# Patient Record
Sex: MALE | Race: BLACK OR AFRICAN AMERICAN | Employment: FULL TIME | URBAN - METROPOLITAN AREA
[De-identification: names, ages, dates, MRNs, and addresses within clinical notes are randomized per-mention and may not be internally consistent; named-entity substitution may affect disease eponyms.]

---

## 2019-01-21 ENCOUNTER — TELEPHONE (OUTPATIENT)
Dept: FAMILY MEDICINE CLINIC | Age: 29
End: 2019-01-21

## 2019-01-21 ENCOUNTER — OFFICE VISIT (OUTPATIENT)
Dept: FAMILY MEDICINE CLINIC | Age: 29
End: 2019-01-21

## 2019-01-21 VITALS
HEART RATE: 70 BPM | RESPIRATION RATE: 14 BRPM | TEMPERATURE: 96.8 F | SYSTOLIC BLOOD PRESSURE: 131 MMHG | WEIGHT: 220 LBS | HEIGHT: 72 IN | BODY MASS INDEX: 29.8 KG/M2 | DIASTOLIC BLOOD PRESSURE: 83 MMHG | OXYGEN SATURATION: 96 %

## 2019-01-21 DIAGNOSIS — R03.0 BORDERLINE HYPERTENSION: Primary | ICD-10-CM

## 2019-01-21 NOTE — PROGRESS NOTES
HISTORY OF PRESENT ILLNESS  Leonel Soto is a 25 y.o. male. HPI In for blood pressure check. Just had a daughter 2 weeks ago. Just started working out again at [employer]. No complaints of chest pain, shortness of breath, TIAs, claudication or edema. ROS    Physical Exam   Constitutional: He appears well-developed and well-nourished. HENT:   Right Ear: External ear normal.   Left Ear: External ear normal.   Mouth/Throat: Oropharynx is clear and moist.   Neck: No thyromegaly present. Cardiovascular: Normal rate, regular rhythm, normal heart sounds and intact distal pulses. Pulmonary/Chest: Effort normal and breath sounds normal. No respiratory distress. He has no wheezes. Abdominal: Soft. Bowel sounds are normal. He exhibits no distension and no mass. There is no tenderness. There is no guarding. Musculoskeletal: Normal range of motion. He exhibits no edema. Lymphadenopathy:     He has no cervical adenopathy. Nursing note and vitals reviewed. ASSESSMENT and PLAN  Orders Placed This Encounter    METABOLIC PANEL, BASIC    LIPID PANEL    CBC WITH AUTOMATED DIFF     Diagnoses and all orders for this visit:    1.  Borderline hypertension  -     METABOLIC PANEL, BASIC  -     LIPID PANEL  -     CBC WITH AUTOMATED DIFF      Follow-up Disposition: Not on File

## 2019-01-21 NOTE — PROGRESS NOTES
Chief Complaint   Patient presents with    Complete Physical    Blood Pressure Check    Allergies     1. Have you been to the ER, urgent care clinic since your last visit? Hospitalized since your last visit? ED 3 weeks ago  Weakness  [facility]    2. Have you seen or consulted any other health care providers outside of the [address] [place] [address] since your last visit? Include any pap smears or colon screening.  No     Health Maintenance Due   Topic Date Due    DTaP/Tdap/Td series (1 - Tdap) 07/07/2009

## 2019-01-22 LAB
BASOPHILS # BLD AUTO: 0 X10E3/UL (ref 0–0.2)
BASOPHILS NFR BLD AUTO: 0 %
BUN SERPL-MCNC: 19 MG/DL (ref 6–20)
BUN/CREAT SERPL: 15 (ref 9–20)
CALCIUM SERPL-MCNC: 9.4 MG/DL (ref 8.7–10.2)
CHLORIDE SERPL-SCNC: 99 MMOL/L (ref 96–106)
CHOLEST SERPL-MCNC: 172 MG/DL (ref 100–199)
CO2 SERPL-SCNC: 26 MMOL/L (ref 20–29)
CREAT SERPL-MCNC: 1.23 MG/DL (ref 0.76–1.27)
EOSINOPHIL # BLD AUTO: 0.1 X10E3/UL (ref 0–0.4)
EOSINOPHIL NFR BLD AUTO: 2 %
ERYTHROCYTE [DISTWIDTH] IN BLOOD BY AUTOMATED COUNT: 13.9 % (ref 12.3–15.4)
GLUCOSE SERPL-MCNC: 77 MG/DL (ref 65–99)
HCT VFR BLD AUTO: 40.1 % (ref 37.5–51)
HDLC SERPL-MCNC: 35 MG/DL
HGB BLD-MCNC: 13.5 G/DL (ref 13–17.7)
IMM GRANULOCYTES # BLD AUTO: 0 X10E3/UL (ref 0–0.1)
IMM GRANULOCYTES NFR BLD AUTO: 0 %
INTERPRETATION, 910389: NORMAL
LDLC SERPL CALC-MCNC: 118 MG/DL (ref 0–99)
LYMPHOCYTES # BLD AUTO: 2.8 X10E3/UL (ref 0.7–3.1)
LYMPHOCYTES NFR BLD AUTO: 61 %
MCH RBC QN AUTO: 30.7 PG (ref 26.6–33)
MCHC RBC AUTO-ENTMCNC: 33.7 G/DL (ref 31.5–35.7)
MCV RBC AUTO: 91 FL (ref 79–97)
MONOCYTES # BLD AUTO: 0.3 X10E3/UL (ref 0.1–0.9)
MONOCYTES NFR BLD AUTO: 6 %
NEUTROPHILS # BLD AUTO: 1.4 X10E3/UL (ref 1.4–7)
NEUTROPHILS NFR BLD AUTO: 31 %
PLATELET # BLD AUTO: 205 X10E3/UL (ref 150–379)
POTASSIUM SERPL-SCNC: 4.1 MMOL/L (ref 3.5–5.2)
RBC # BLD AUTO: 4.4 X10E6/UL (ref 4.14–5.8)
SODIUM SERPL-SCNC: 139 MMOL/L (ref 134–144)
TRIGL SERPL-MCNC: 95 MG/DL (ref 0–149)
VLDLC SERPL CALC-MCNC: 19 MG/DL (ref 5–40)
WBC # BLD AUTO: 4.7 X10E3/UL (ref 3.4–10.8)

## 2019-07-22 ENCOUNTER — OFFICE VISIT (OUTPATIENT)
Dept: FAMILY MEDICINE CLINIC | Age: 29
End: 2019-07-22

## 2019-07-22 VITALS
TEMPERATURE: 98.4 F | SYSTOLIC BLOOD PRESSURE: 133 MMHG | WEIGHT: 218 LBS | BODY MASS INDEX: 28.89 KG/M2 | RESPIRATION RATE: 18 BRPM | HEIGHT: 73 IN | DIASTOLIC BLOOD PRESSURE: 87 MMHG | OXYGEN SATURATION: 100 % | HEART RATE: 72 BPM

## 2019-07-22 DIAGNOSIS — R03.0 BORDERLINE HIGH BLOOD PRESSURE: Primary | ICD-10-CM

## 2019-07-22 RX ORDER — GLUCOSAMINE SULFATE 1500 MG
POWDER IN PACKET (EA) ORAL DAILY
COMMUNITY

## 2019-07-22 NOTE — PROGRESS NOTES
Chief Complaint   Patient presents with    Blood Pressure Check     Pt here for 6 month follow up. Borderline HTN    Pt went to Ed on 7/7/19. For heart palpations. Pt had EKG done. Pt had  Cardiac arrythmia. Given low dose of asa. Pt  wear heart monitor  For 30 days to monitor. Will go back cardiologist in 3-4 weeks.

## 2019-07-22 NOTE — PROGRESS NOTES
HISTORY OF PRESENT ILLNESS  Viola Wilkins is a 30 y.o. male. HPI Pt. Comes in for blood pressure check. Was in [facility] one month ago, for what sounded like a fib, wasn't started on any new meds. Now is on baby aspirin. Has also been wearing a heart monitor. Has been back working out for the last 2 1./2 weeks. Has been smoking 2 black and milds a day. ROS    Physical Exam   Constitutional: He appears well-developed and well-nourished. HENT:   Right Ear: External ear normal.   Left Ear: External ear normal.   Mouth/Throat: Oropharynx is clear and moist.   Neck: No thyromegaly present. Cardiovascular: Normal rate, regular rhythm, normal heart sounds and intact distal pulses. Pulmonary/Chest: Effort normal and breath sounds normal. No respiratory distress. He has no wheezes. Abdominal: Soft. Bowel sounds are normal. He exhibits no distension and no mass. There is no tenderness. There is no guarding. Musculoskeletal: Normal range of motion. He exhibits no edema. Lymphadenopathy:     He has no cervical adenopathy. Nursing note and vitals reviewed. ASSESSMENT and PLAN  Orders Placed This Encounter    CBC WITH AUTOMATED DIFF    LIPID PANEL    METABOLIC PANEL, COMPREHENSIVE     Orders Placed This Encounter    CBC WITH AUTOMATED DIFF    LIPID PANEL    METABOLIC PANEL, COMPREHENSIVE     Diagnoses and all orders for this visit:    1.  Borderline high blood pressure  -     CBC WITH AUTOMATED DIFF  -     LIPID PANEL  -     METABOLIC PANEL, COMPREHENSIVE

## 2022-10-24 ENCOUNTER — OFFICE VISIT (OUTPATIENT)
Dept: FAMILY MEDICINE CLINIC | Age: 32
End: 2022-10-24
Payer: COMMERCIAL

## 2022-10-24 VITALS
SYSTOLIC BLOOD PRESSURE: 147 MMHG | OXYGEN SATURATION: 99 % | TEMPERATURE: 99 F | WEIGHT: 217 LBS | DIASTOLIC BLOOD PRESSURE: 91 MMHG | HEIGHT: 73 IN | HEART RATE: 77 BPM | BODY MASS INDEX: 28.76 KG/M2 | RESPIRATION RATE: 18 BRPM

## 2022-10-24 DIAGNOSIS — E11.9 DIABETES MELLITUS TYPE 2, DIET-CONTROLLED (HCC): Primary | ICD-10-CM

## 2022-10-24 LAB
GLUCOSE POC: 125 MG/DL
HBA1C MFR BLD HPLC: 10.8 %

## 2022-10-24 PROCEDURE — 99213 OFFICE O/P EST LOW 20 MIN: CPT | Performed by: FAMILY MEDICINE

## 2022-10-24 PROCEDURE — 83036 HEMOGLOBIN GLYCOSYLATED A1C: CPT | Performed by: FAMILY MEDICINE

## 2022-10-24 PROCEDURE — 3046F HEMOGLOBIN A1C LEVEL >9.0%: CPT | Performed by: FAMILY MEDICINE

## 2022-10-24 PROCEDURE — 82947 ASSAY GLUCOSE BLOOD QUANT: CPT | Performed by: FAMILY MEDICINE

## 2022-10-24 RX ORDER — METFORMIN HYDROCHLORIDE 500 MG/1
TABLET ORAL 2 TIMES DAILY WITH MEALS
COMMUNITY
End: 2022-10-31 | Stop reason: SDUPTHER

## 2022-10-24 NOTE — PROGRESS NOTES
Chief Complaint   Patient presents with    Follow-up     Diabetes follow up        1. \"Have you been to the ER, urgent care clinic since your last visit? Hospitalized since your last visit? \" No    2. \"Have you seen or consulted any other health care providers outside of the [address] [place] [address] since your last visit? \" No     3. For patients aged 39-70: Has the patient had a colonoscopy / FIT/ Cologuard? NA - based on age      If the patient is female:    4. For patients aged 41-77: Has the patient had a mammogram within the past 2 years? NA - based on age or sex      11. For patients aged 21-65: Has the patient had a pap smear?  NA - based on age or sex    Health Maintenance Due   Topic Date Due    Hepatitis C Screening  Never done    Depression Screen  Never done    COVID-19 Vaccine (1) Never done    Pneumococcal 0-64 years (1 - PCV) Never done    DTaP/Tdap/Td series (1 - Tdap) Never done    Flu Vaccine (1) Never done

## 2022-10-24 NOTE — PROGRESS NOTES
HISTORY OF PRESENT ILLNESS  Eben Blanco is a 27 y.o. male. HPIwent to work for [employer] one year ago, likes it so far. Has been having polyuria, polydipsia for 3 weeks. [facility], blood sugar was 588. Was started on metformin 500 mg bid and has been checking his blood sugar at home. Readings have been in the 100s. Polyuria and polydipsia have improved. No complaints of chest pain, shortness of breath, TIAs, claudication or edema. Has been on a strict diet for 2 weeks. Avoiding carbs and sugar. Eating lots of fruit. Blood sugar today is 125. A1C is  10.8. Used to weigh around 200. Has got up to 230 in the last year or so. ROS    Physical Exam  Vitals and nursing note reviewed. Constitutional:       Appearance: He is well-developed. HENT:      Right Ear: External ear normal.      Left Ear: External ear normal.   Neck:      Thyroid: No thyromegaly. Cardiovascular:      Rate and Rhythm: Normal rate and regular rhythm. Heart sounds: Normal heart sounds. Pulmonary:      Effort: Pulmonary effort is normal. No respiratory distress. Breath sounds: Normal breath sounds. No wheezing. Abdominal:      General: Bowel sounds are normal. There is no distension. Palpations: Abdomen is soft. There is no mass. Tenderness: There is no abdominal tenderness. There is no guarding. Musculoskeletal:         General: Normal range of motion. Lymphadenopathy:      Cervical: No cervical adenopathy. ASSESSMENT and PLAN  Orders Placed This Encounter    AMB POC GLUCOSE, QUANTITATIVE, BLOOD    AMB POC HEMOGLOBIN A1C     Diagnoses and all orders for this visit:    1. Diabetes mellitus type 2, diet-controlled (HCC)  -     AMB POC GLUCOSE, QUANTITATIVE, BLOOD  -     AMB POC HEMOGLOBIN A1C      Follow-up and Dispositions    Return in about 2 months (around 12/24/2022). Will schedule appt with Ms. Anne. Continue metformin for now.

## 2022-10-31 RX ORDER — METFORMIN HYDROCHLORIDE 500 MG/1
500 TABLET ORAL 2 TIMES DAILY WITH MEALS
Qty: 180 TABLET | Refills: 1 | Status: SHIPPED | OUTPATIENT
Start: 2022-10-31

## 2022-10-31 NOTE — TELEPHONE ENCOUNTER
----- Message from Janay Syd sent at 10/31/2022 12:40 PM EDT -----  Subject: Refill Request    QUESTIONS  Name of Medication? Other - Metformin  Patient-reported dosage and instructions? 500mg twice daily  How many days do you have left? 8  Preferred Pharmacy? [id number]  Pharmacy phone number (if available)? [phone]  ---------------------------------------------------------------------------  --------------  Curt PUGA  What is the best way for the office to contact you? OK to leave message on   voicemail  Preferred Call Back Phone Number? [phone]  ---------------------------------------------------------------------------  --------------  SCRIPT ANSWERS  Relationship to Patient?  Self

## 2022-12-27 ENCOUNTER — OFFICE VISIT (OUTPATIENT)
Dept: FAMILY MEDICINE CLINIC | Age: 32
End: 2022-12-27
Payer: COMMERCIAL

## 2022-12-27 VITALS
WEIGHT: 222 LBS | RESPIRATION RATE: 16 BRPM | TEMPERATURE: 97.8 F | HEART RATE: 80 BPM | DIASTOLIC BLOOD PRESSURE: 104 MMHG | BODY MASS INDEX: 30.07 KG/M2 | HEIGHT: 72 IN | SYSTOLIC BLOOD PRESSURE: 149 MMHG

## 2022-12-27 DIAGNOSIS — E11.9 CONTROLLED TYPE 2 DIABETES MELLITUS WITHOUT COMPLICATION, WITHOUT LONG-TERM CURRENT USE OF INSULIN (HCC): Primary | ICD-10-CM

## 2022-12-27 LAB
GLUCOSE POC: 151 MG/DL
HBA1C MFR BLD HPLC: 8 %

## 2022-12-27 PROCEDURE — 82962 GLUCOSE BLOOD TEST: CPT | Performed by: FAMILY MEDICINE

## 2022-12-27 PROCEDURE — 3052F HG A1C>EQUAL 8.0%<EQUAL 9.0%: CPT | Performed by: FAMILY MEDICINE

## 2022-12-27 PROCEDURE — 83036 HEMOGLOBIN GLYCOSYLATED A1C: CPT | Performed by: FAMILY MEDICINE

## 2022-12-27 PROCEDURE — 99213 OFFICE O/P EST LOW 20 MIN: CPT | Performed by: FAMILY MEDICINE

## 2022-12-27 NOTE — PROGRESS NOTES
HISTORY OF PRESENT ILLNESS  Yi Amos is a 27 y.o. male. HPI In for blood pressure and cholesterol check. No complaints of chest pain, shortness of breath, TIAs, claudication or edema. Takes metformin 500 mg bid for diabetes      ROS    Physical Exam  Vitals and nursing note reviewed. Constitutional:       Appearance: He is well-developed. HENT:      Right Ear: External ear normal.      Left Ear: External ear normal.   Neck:      Thyroid: No thyromegaly. Cardiovascular:      Rate and Rhythm: Normal rate and regular rhythm. Heart sounds: Normal heart sounds. Pulmonary:      Effort: Pulmonary effort is normal. No respiratory distress. Breath sounds: Normal breath sounds. No wheezing. Abdominal:      General: Bowel sounds are normal. There is no distension. Palpations: Abdomen is soft. There is no mass. Tenderness: There is no abdominal tenderness. There is no guarding. Musculoskeletal:         General: Normal range of motion. Lymphadenopathy:      Cervical: No cervical adenopathy. ASSESSMENT and PLAN  Orders Placed This Encounter    CBC WITH AUTOMATED DIFF    METABOLIC PANEL, COMPREHENSIVE    LIPID PANEL    AMB POC HEMOGLOBIN A1C    AMB POC GLUCOSE BLOOD, BY GLUCOSE MONITORING DEVICE     Diagnoses and all orders for this visit:    1. Controlled type 2 diabetes mellitus without complication, without long-term current use of insulin (HCC)  -     AMB POC HEMOGLOBIN A1C  -     AMB POC GLUCOSE BLOOD, BY GLUCOSE MONITORING DEVICE  -     CBC WITH AUTOMATED DIFF; Future  -     METABOLIC PANEL, COMPREHENSIVE; Future  -     LIPID PANEL; Future    Recheck in 3 months.

## 2022-12-27 NOTE — PROGRESS NOTES
Chief Complaint   Patient presents with    Follow-up       1. \"Have you been to the ER, urgent care clinic since your last visit? Hospitalized since your last visit? \" No    2. \"Have you seen or consulted any other health care providers outside of the [address] [place] [address] since your last visit? \" No     3. For patients aged 39-70: Has the patient had a colonoscopy / FIT/ Cologuard? NA - based on age      If the patient is female:    4. For patients aged 41-77: Has the patient had a mammogram within the past 2 years? NA - based on age or sex      11. For patients aged 21-65: Has the patient had a pap smear?  NA - based on age or sex    Health Maintenance Due   Topic Date Due    Hepatitis C Screening  Never done    COVID-19 Vaccine (1) Never done    Pneumococcal 0-64 years (1 - PCV) Never done    DTaP/Tdap/Td series (1 - Tdap) Never done    Lipid Screen  01/21/2020    Flu Vaccine (1) Never done

## 2022-12-28 LAB
ALBUMIN SERPL-MCNC: 4.5 G/DL (ref 3.5–5)
ALBUMIN/GLOB SERPL: 1.4 {RATIO} (ref 1.1–2.2)
ALP SERPL-CCNC: 31 U/L (ref 45–117)
ALT SERPL-CCNC: 37 U/L (ref 12–78)
ANION GAP SERPL CALC-SCNC: 6 MMOL/L (ref 5–15)
AST SERPL-CCNC: 26 U/L (ref 15–37)
BASOPHILS # BLD: 0 K/UL (ref 0–0.1)
BASOPHILS NFR BLD: 1 % (ref 0–1)
BILIRUB SERPL-MCNC: 0.6 MG/DL (ref 0.2–1)
BUN SERPL-MCNC: 15 MG/DL (ref 6–20)
BUN/CREAT SERPL: 12 (ref 12–20)
CALCIUM SERPL-MCNC: 9.5 MG/DL (ref 8.5–10.1)
CHLORIDE SERPL-SCNC: 104 MMOL/L (ref 97–108)
CHOLEST SERPL-MCNC: 203 MG/DL
CO2 SERPL-SCNC: 28 MMOL/L (ref 21–32)
CREAT SERPL-MCNC: 1.29 MG/DL (ref 0.7–1.3)
DIFFERENTIAL METHOD BLD: ABNORMAL
EOSINOPHIL # BLD: 0.1 K/UL (ref 0–0.4)
EOSINOPHIL NFR BLD: 2 % (ref 0–7)
ERYTHROCYTE [DISTWIDTH] IN BLOOD BY AUTOMATED COUNT: 13.1 % (ref 11.5–14.5)
GLOBULIN SER CALC-MCNC: 3.3 G/DL (ref 2–4)
GLUCOSE SERPL-MCNC: 119 MG/DL (ref 65–100)
HCT VFR BLD AUTO: 45.5 % (ref 36.6–50.3)
HDLC SERPL-MCNC: 35 MG/DL
HDLC SERPL: 5.8 {RATIO} (ref 0–5)
HGB BLD-MCNC: 14.9 G/DL (ref 12.1–17)
IMM GRANULOCYTES # BLD AUTO: 0 K/UL (ref 0–0.04)
IMM GRANULOCYTES NFR BLD AUTO: 0 % (ref 0–0.5)
LDLC SERPL CALC-MCNC: ABNORMAL MG/DL (ref 0–100)
LDLC SERPL DIRECT ASSAY-MCNC: 133 MG/DL (ref 0–100)
LYMPHOCYTES # BLD: 3.6 K/UL (ref 0.8–3.5)
LYMPHOCYTES NFR BLD: 53 % (ref 12–49)
MCH RBC QN AUTO: 30.4 PG (ref 26–34)
MCHC RBC AUTO-ENTMCNC: 32.7 G/DL (ref 30–36.5)
MCV RBC AUTO: 92.9 FL (ref 80–99)
MONOCYTES # BLD: 0.3 K/UL (ref 0–1)
MONOCYTES NFR BLD: 4 % (ref 5–13)
NEUTS SEG # BLD: 2.7 K/UL (ref 1.8–8)
NEUTS SEG NFR BLD: 40 % (ref 32–75)
NRBC # BLD: 0 K/UL (ref 0–0.01)
NRBC BLD-RTO: 0 PER 100 WBC
PLATELET # BLD AUTO: 224 K/UL (ref 150–400)
PMV BLD AUTO: 10.6 FL (ref 8.9–12.9)
POTASSIUM SERPL-SCNC: 4.2 MMOL/L (ref 3.5–5.1)
PROT SERPL-MCNC: 7.8 G/DL (ref 6.4–8.2)
RBC # BLD AUTO: 4.9 M/UL (ref 4.1–5.7)
SODIUM SERPL-SCNC: 138 MMOL/L (ref 136–145)
TRIGL SERPL-MCNC: 447 MG/DL (ref ?–150)
VLDLC SERPL CALC-MCNC: ABNORMAL MG/DL
WBC # BLD AUTO: 6.8 K/UL (ref 4.1–11.1)

## 2023-01-03 RX ORDER — ATORVASTATIN CALCIUM 20 MG/1
20 TABLET, FILM COATED ORAL DAILY
Qty: 90 TABLET | Refills: 3 | Status: SHIPPED | OUTPATIENT
Start: 2023-01-03

## 2023-01-04 NOTE — PROGRESS NOTES
Pc with pt. Will add atorvastatin for cholesterol. Continue metformin for now. Pt. Says that sugars dropi nto the 40s  at times, and he is reluctant to increase his metformin dose.

## 2023-07-13 RX ORDER — LANCETS 33 GAUGE
EACH MISCELLANEOUS
Qty: 100 EACH | Refills: 3 | Status: SHIPPED | OUTPATIENT
Start: 2023-07-13

## 2023-07-13 RX ORDER — BLOOD SUGAR DIAGNOSTIC
STRIP MISCELLANEOUS
Qty: 100 STRIP | Refills: 3 | Status: SHIPPED | OUTPATIENT
Start: 2023-07-13

## 2023-07-13 RX ORDER — BLOOD-GLUCOSE METER
EACH MISCELLANEOUS
Qty: 1 KIT | Refills: 0 | Status: SHIPPED | OUTPATIENT
Start: 2023-07-13

## 2023-07-13 NOTE — TELEPHONE ENCOUNTER
Last appointment: 12/27/22  Next appointment: no show 5/30/23    Requested Prescriptions     Pending Prescriptions Disp Refills    blood glucose test strips (ONETOUCH VERIO) strip 100 strip 3     Sig: Check glucose once per day as directed  Dx W62.4    OneTouch Delica Lancets 49H MISC 100 each 3     Sig: Check glucose once per day as directed  Dx E11.9    Blood Glucose Monitoring Suppl (ONETOUCH VERIO) w/Device KIT 1 kit 0     Sig: Check glucose once per day as directed  Dx E11.9         For Pharmacy Admin Tracking Only    Program: Medication Refill  CPA in place:    Recommendation Provided To:    Intervention Detail: New Rx: 3, reason: Patient Preference  Intervention Accepted By:   Leonardo Dela Cruz Closed?:    Time Spent (min): 5

## 2023-07-20 ENCOUNTER — OFFICE VISIT (OUTPATIENT)
Age: 33
End: 2023-07-20
Payer: COMMERCIAL

## 2023-07-20 VITALS
SYSTOLIC BLOOD PRESSURE: 142 MMHG | HEART RATE: 86 BPM | HEIGHT: 72 IN | OXYGEN SATURATION: 98 % | RESPIRATION RATE: 16 BRPM | DIASTOLIC BLOOD PRESSURE: 95 MMHG | WEIGHT: 216.4 LBS | BODY MASS INDEX: 29.31 KG/M2 | TEMPERATURE: 97.8 F

## 2023-07-20 DIAGNOSIS — E78.00 HYPERCHOLESTEROLEMIA: ICD-10-CM

## 2023-07-20 DIAGNOSIS — I10 ESSENTIAL HYPERTENSION: Primary | ICD-10-CM

## 2023-07-20 DIAGNOSIS — F17.210 CIGARETTE SMOKER: ICD-10-CM

## 2023-07-20 DIAGNOSIS — E11.9 TYPE 2 DIABETES MELLITUS WITHOUT COMPLICATION, WITHOUT LONG-TERM CURRENT USE OF INSULIN (HCC): ICD-10-CM

## 2023-07-20 PROCEDURE — 3080F DIAST BP >= 90 MM HG: CPT | Performed by: FAMILY MEDICINE

## 2023-07-20 PROCEDURE — 3077F SYST BP >= 140 MM HG: CPT | Performed by: FAMILY MEDICINE

## 2023-07-20 PROCEDURE — 99214 OFFICE O/P EST MOD 30 MIN: CPT | Performed by: FAMILY MEDICINE

## 2023-07-20 RX ORDER — ATORVASTATIN CALCIUM 20 MG/1
20 TABLET, FILM COATED ORAL DAILY
Qty: 90 TABLET | Refills: 3 | Status: SHIPPED | OUTPATIENT
Start: 2023-07-20

## 2023-07-20 RX ORDER — NICOTINE 21 MG/24HR
1 PATCH, TRANSDERMAL 24 HOURS TRANSDERMAL DAILY
Qty: 30 PATCH | Refills: 5 | Status: SHIPPED | OUTPATIENT
Start: 2023-07-20 | End: 2024-01-16

## 2023-07-20 RX ORDER — HYDROCHLOROTHIAZIDE 12.5 MG/1
12.5 CAPSULE, GELATIN COATED ORAL EVERY MORNING
Qty: 90 CAPSULE | Refills: 3 | Status: SHIPPED | OUTPATIENT
Start: 2023-07-20

## 2023-07-20 SDOH — ECONOMIC STABILITY: FOOD INSECURITY: WITHIN THE PAST 12 MONTHS, THE FOOD YOU BOUGHT JUST DIDN'T LAST AND YOU DIDN'T HAVE MONEY TO GET MORE.: NEVER TRUE

## 2023-07-20 SDOH — ECONOMIC STABILITY: INCOME INSECURITY: HOW HARD IS IT FOR YOU TO PAY FOR THE VERY BASICS LIKE FOOD, HOUSING, MEDICAL CARE, AND HEATING?: NOT HARD AT ALL

## 2023-07-20 SDOH — ECONOMIC STABILITY: FOOD INSECURITY: WITHIN THE PAST 12 MONTHS, YOU WORRIED THAT YOUR FOOD WOULD RUN OUT BEFORE YOU GOT MONEY TO BUY MORE.: NEVER TRUE

## 2023-07-20 SDOH — ECONOMIC STABILITY: HOUSING INSECURITY
IN THE LAST 12 MONTHS, WAS THERE A TIME WHEN YOU DID NOT HAVE A STEADY PLACE TO SLEEP OR SLEPT IN A SHELTER (INCLUDING NOW)?: NO

## 2023-07-20 ASSESSMENT — PATIENT HEALTH QUESTIONNAIRE - PHQ9
SUM OF ALL RESPONSES TO PHQ QUESTIONS 1-9: 0
SUM OF ALL RESPONSES TO PHQ9 QUESTIONS 1 & 2: 0
1. LITTLE INTEREST OR PLEASURE IN DOING THINGS: 0
SUM OF ALL RESPONSES TO PHQ QUESTIONS 1-9: 0
2. FEELING DOWN, DEPRESSED OR HOPELESS: 0

## 2023-07-20 NOTE — PROGRESS NOTES
Tasha Teran (:  1990) is a 26 y.o. male,Established patient, here for evaluation of the following chief complaint(s): Annual Exam         ASSESSMENT/PLAN:  1. Essential hypertension  -     hydroCHLOROthiazide (MICROZIDE) 12.5 MG capsule; Take 1 capsule by mouth every morning For blood pressure, Disp-90 capsule, R-3Normal  -     Comprehensive Metabolic Panel; Future  -     CBC with Auto Differential; Future  2. Type 2 diabetes mellitus without complication, without long-term current use of insulin (HCC)  -     metFORMIN (GLUCOPHAGE) 500 MG tablet; Take 1 tablet by mouth 2 times daily (with meals), Disp-180 tablet, R-3Normal  -     Hemoglobin A1C; Future  -     Microalbumin / Creatinine Urine Ratio; Future  3. Hypercholesterolemia  -     atorvastatin (LIPITOR) 20 MG tablet; Take 1 tablet by mouth daily For cholesterol, Disp-90 tablet, R-3Normal  -     Lipid Panel; Future  4. Cigarette smoker  -     nicotine (NICODERM CQ) 21 MG/24HR; Place 1 patch onto the skin daily, Disp-30 patch, R-5Normal      No follow-ups on file. Subjective   SUBJECTIVE/OBJECTIVE:  HPI has been working for [employer] for the last 2 years. Needs to have diabetes and cholesterol checked. checked. Gets some occasional discomfort in chest if eats, takes meds and lies down. Does a fair amount of walking and weight lifting- no chest pains. Would also like to quit smoking. Review of Systems       Objective   Physical Exam  Cardiovascular:      Rate and Rhythm: Normal rate and regular rhythm. Heart sounds: Normal heart sounds. No murmur heard. Pulmonary:      Effort: Pulmonary effort is normal.      Breath sounds: Normal breath sounds. Abdominal:      General: Abdomen is flat. Bowel sounds are normal.      Palpations: Abdomen is soft. Musculoskeletal:      Right lower leg: No edema. Left lower leg: No edema. An electronic signature was used to authenticate this note.     --Fabiola Smyth MD

## 2023-07-20 NOTE — PROGRESS NOTES
Chief Complaint   Patient presents with    Annual Exam     Patient would assistance with stop smoking. 1. \"Have you been to the ER, urgent care clinic since your last visit? Hospitalized since your last visit? \" no    2. \"Have you seen or consulted any other health care providers outside of the [address] [place] [address] since your last visit? \" no     3. For patients aged 43-73: Has the patient had a colonoscopy / FIT/ Cologuard? na      If the patient is female:    4. For patients aged 43-66: Has the patient had a mammogram within the past 2 years? na      5.  For patients aged 21-65: Has the patient had a pap smear? na      Health Maintenance Due   Topic Date Due    Varicella vaccine (1 of 2 - 2-dose childhood series) Never done    Pneumococcal 0-64 years Vaccine (1 - PCV) Never done    Diabetic foot exam  Never done    HIV screen  Never done    Diabetic Alb to Cr ratio (uACR) test  Never done    Diabetic retinal exam  Never done    Hepatitis C screen  Never done    Hepatitis B vaccine (1 of 3 - Risk 3-dose series) Never done    DTaP/Tdap/Td vaccine (1 - Tdap) Never done    COVID-19 Vaccine (4 - Booster for Moderna series) 02/22/2022

## 2023-07-21 LAB
ALBUMIN SERPL-MCNC: 4.4 G/DL (ref 3.5–5)
ALBUMIN/GLOB SERPL: 1.5 (ref 1.1–2.2)
ALP SERPL-CCNC: 35 U/L (ref 45–117)
ALT SERPL-CCNC: 38 U/L (ref 12–78)
ANION GAP SERPL CALC-SCNC: 3 MMOL/L (ref 5–15)
AST SERPL-CCNC: 17 U/L (ref 15–37)
BASOPHILS # BLD: 0 K/UL (ref 0–0.1)
BASOPHILS NFR BLD: 0 % (ref 0–1)
BILIRUB SERPL-MCNC: 0.8 MG/DL (ref 0.2–1)
BUN SERPL-MCNC: 15 MG/DL (ref 6–20)
BUN/CREAT SERPL: 11 (ref 12–20)
CALCIUM SERPL-MCNC: 9.6 MG/DL (ref 8.5–10.1)
CHLORIDE SERPL-SCNC: 103 MMOL/L (ref 97–108)
CHOLEST SERPL-MCNC: 96 MG/DL
CO2 SERPL-SCNC: 28 MMOL/L (ref 21–32)
CREAT SERPL-MCNC: 1.32 MG/DL (ref 0.7–1.3)
CREAT UR-MCNC: 344 MG/DL
DIFFERENTIAL METHOD BLD: NORMAL
EOSINOPHIL # BLD: 0.1 K/UL (ref 0–0.4)
EOSINOPHIL NFR BLD: 1 % (ref 0–7)
ERYTHROCYTE [DISTWIDTH] IN BLOOD BY AUTOMATED COUNT: 12.6 % (ref 11.5–14.5)
EST. AVERAGE GLUCOSE BLD GHB EST-MCNC: 206 MG/DL
GLOBULIN SER CALC-MCNC: 3 G/DL (ref 2–4)
GLUCOSE SERPL-MCNC: 247 MG/DL (ref 65–100)
HBA1C MFR BLD: 8.8 % (ref 4–5.6)
HCT VFR BLD AUTO: 43.7 % (ref 36.6–50.3)
HDLC SERPL-MCNC: 34 MG/DL
HDLC SERPL: 2.8 (ref 0–5)
HGB BLD-MCNC: 13.8 G/DL (ref 12.1–17)
IMM GRANULOCYTES # BLD AUTO: 0 K/UL (ref 0–0.04)
IMM GRANULOCYTES NFR BLD AUTO: 0 % (ref 0–0.5)
LDLC SERPL CALC-MCNC: 31.4 MG/DL (ref 0–100)
LYMPHOCYTES # BLD: 2 K/UL (ref 0.8–3.5)
LYMPHOCYTES NFR BLD: 29 % (ref 12–49)
MCH RBC QN AUTO: 30.3 PG (ref 26–34)
MCHC RBC AUTO-ENTMCNC: 31.6 G/DL (ref 30–36.5)
MCV RBC AUTO: 95.8 FL (ref 80–99)
MICROALBUMIN UR-MCNC: 7.22 MG/DL
MICROALBUMIN/CREAT UR-RTO: 21 MG/G (ref 0–30)
MONOCYTES # BLD: 0.4 K/UL (ref 0–1)
MONOCYTES NFR BLD: 6 % (ref 5–13)
NEUTS SEG # BLD: 4.4 K/UL (ref 1.8–8)
NEUTS SEG NFR BLD: 64 % (ref 32–75)
NRBC # BLD: 0 K/UL (ref 0–0.01)
NRBC BLD-RTO: 0 PER 100 WBC
PLATELET # BLD AUTO: 191 K/UL (ref 150–400)
PMV BLD AUTO: 10.3 FL (ref 8.9–12.9)
POTASSIUM SERPL-SCNC: 4.5 MMOL/L (ref 3.5–5.1)
PROT SERPL-MCNC: 7.4 G/DL (ref 6.4–8.2)
RBC # BLD AUTO: 4.56 M/UL (ref 4.1–5.7)
SODIUM SERPL-SCNC: 134 MMOL/L (ref 136–145)
TRIGL SERPL-MCNC: 153 MG/DL
VLDLC SERPL CALC-MCNC: 30.6 MG/DL
WBC # BLD AUTO: 7 K/UL (ref 4.1–11.1)

## 2023-07-25 ENCOUNTER — TELEPHONE (OUTPATIENT)
Age: 33
End: 2023-07-25

## 2023-07-25 DIAGNOSIS — E11.9 TYPE 2 DIABETES MELLITUS WITHOUT COMPLICATION, WITHOUT LONG-TERM CURRENT USE OF INSULIN (HCC): ICD-10-CM

## 2023-11-30 ENCOUNTER — OFFICE VISIT (OUTPATIENT)
Age: 33
End: 2023-11-30
Payer: COMMERCIAL

## 2023-11-30 VITALS
HEART RATE: 84 BPM | DIASTOLIC BLOOD PRESSURE: 80 MMHG | HEIGHT: 73 IN | TEMPERATURE: 98 F | WEIGHT: 210 LBS | BODY MASS INDEX: 27.83 KG/M2 | SYSTOLIC BLOOD PRESSURE: 135 MMHG | RESPIRATION RATE: 16 BRPM | OXYGEN SATURATION: 99 %

## 2023-11-30 DIAGNOSIS — E11.9 TYPE 2 DIABETES MELLITUS WITHOUT COMPLICATION, WITHOUT LONG-TERM CURRENT USE OF INSULIN (HCC): ICD-10-CM

## 2023-11-30 DIAGNOSIS — E78.00 HYPERCHOLESTEROLEMIA: ICD-10-CM

## 2023-11-30 DIAGNOSIS — I10 ESSENTIAL HYPERTENSION: Primary | ICD-10-CM

## 2023-11-30 LAB
BASOPHILS # BLD: 0 K/UL (ref 0–0.1)
BASOPHILS NFR BLD: 0 % (ref 0–1)
DIFFERENTIAL METHOD BLD: ABNORMAL
EOSINOPHIL # BLD: 0.1 K/UL (ref 0–0.4)
EOSINOPHIL NFR BLD: 2 % (ref 0–7)
ERYTHROCYTE [DISTWIDTH] IN BLOOD BY AUTOMATED COUNT: 13 % (ref 11.5–14.5)
HCT VFR BLD AUTO: 40.9 % (ref 36.6–50.3)
HGB BLD-MCNC: 13.4 G/DL (ref 12.1–17)
IMM GRANULOCYTES # BLD AUTO: 0 K/UL (ref 0–0.04)
IMM GRANULOCYTES NFR BLD AUTO: 0 % (ref 0–0.5)
LYMPHOCYTES # BLD: 4 K/UL (ref 0.8–3.5)
LYMPHOCYTES NFR BLD: 58 % (ref 12–49)
MCH RBC QN AUTO: 30.5 PG (ref 26–34)
MCHC RBC AUTO-ENTMCNC: 32.8 G/DL (ref 30–36.5)
MCV RBC AUTO: 93.2 FL (ref 80–99)
MONOCYTES # BLD: 0.4 K/UL (ref 0–1)
MONOCYTES NFR BLD: 6 % (ref 5–13)
NEUTS SEG # BLD: 2.3 K/UL (ref 1.8–8)
NEUTS SEG NFR BLD: 34 % (ref 32–75)
NRBC # BLD: 0 K/UL (ref 0–0.01)
NRBC BLD-RTO: 0 PER 100 WBC
PLATELET # BLD AUTO: 196 K/UL (ref 150–400)
PMV BLD AUTO: 10 FL (ref 8.9–12.9)
RBC # BLD AUTO: 4.39 M/UL (ref 4.1–5.7)
WBC # BLD AUTO: 6.8 K/UL (ref 4.1–11.1)

## 2023-11-30 PROCEDURE — 3079F DIAST BP 80-89 MM HG: CPT | Performed by: FAMILY MEDICINE

## 2023-11-30 PROCEDURE — 99214 OFFICE O/P EST MOD 30 MIN: CPT | Performed by: FAMILY MEDICINE

## 2023-11-30 PROCEDURE — 3075F SYST BP GE 130 - 139MM HG: CPT | Performed by: FAMILY MEDICINE

## 2023-11-30 PROCEDURE — 3052F HG A1C>EQUAL 8.0%<EQUAL 9.0%: CPT | Performed by: FAMILY MEDICINE

## 2023-11-30 NOTE — PROGRESS NOTES
Mary Barrientos (:  1990) is a 26 y.o. male,Established patient, here for evaluation of the following chief complaint(s):  Diabetes, Cholesterol Problem, Hypertension, and Follow-up         ASSESSMENT/PLAN:  1. Essential hypertension  -     Comprehensive Metabolic Panel; Future  -     CBC with Auto Differential; Future  2. Type 2 diabetes mellitus without complication, without long-term current use of insulin (HCC)  -     Hemoglobin A1C; Future  3. Hypercholesterolemia  -     Lipid Panel; Future      No follow-ups on file. Subjective   SUBJECTIVE/OBJECTIVE:  HPI  Pt. Comes in for blood pressure, cholesterol, and diabetes check. No complaints of chest pain, shortness of breath, TIAs, claudication or edema. Still smoking. Still working at [employer]. No past medical history on file. Social History     Socioeconomic History    Marital status: Single     Spouse name: None    Number of children: None    Years of education: None    Highest education level: None   Tobacco Use    Smoking status: Every Day    Smokeless tobacco: Never   Substance and Sexual Activity    Alcohol use: No    Drug use: No   Social History Narrative    Working at [employer] in [facility] for the last year. , has a daughter 3years old.       Social Determinants of Health     Financial Resource Strain: Low Risk  (2023)    Overall Financial Resource Strain (CARDIA)     Difficulty of Paying Living Expenses: Not hard at all   Transportation Needs: Unknown (2023)    PRAPARE - Transportation     Lack of Transportation (Non-Medical): No   Housing Stability: Unknown (2023)    Housing Stability Vital Sign     Unstable Housing in the Last Year: No       Current Outpatient Medications   Medication Sig Dispense Refill    metFORMIN (GLUCOPHAGE) 1000 MG tablet Take 1 tablet by mouth 2 times daily (with meals) 60 tablet 12    hydroCHLOROthiazide (MICROZIDE) 12.5 MG capsule Take 1 capsule by mouth every morning For blood pressure

## 2023-12-01 LAB
ALBUMIN SERPL-MCNC: 4.4 G/DL (ref 3.5–5)
ALBUMIN/GLOB SERPL: 1.3 (ref 1.1–2.2)
ALP SERPL-CCNC: 24 U/L (ref 45–117)
ALT SERPL-CCNC: 31 U/L (ref 12–78)
ANION GAP SERPL CALC-SCNC: 7 MMOL/L (ref 5–15)
AST SERPL-CCNC: 19 U/L (ref 15–37)
BILIRUB SERPL-MCNC: 0.4 MG/DL (ref 0.2–1)
BUN SERPL-MCNC: 17 MG/DL (ref 6–20)
BUN/CREAT SERPL: 13 (ref 12–20)
CALCIUM SERPL-MCNC: 9.2 MG/DL (ref 8.5–10.1)
CHLORIDE SERPL-SCNC: 103 MMOL/L (ref 97–108)
CHOLEST SERPL-MCNC: 107 MG/DL
CO2 SERPL-SCNC: 28 MMOL/L (ref 21–32)
CREAT SERPL-MCNC: 1.3 MG/DL (ref 0.7–1.3)
EST. AVERAGE GLUCOSE BLD GHB EST-MCNC: 146 MG/DL
GLOBULIN SER CALC-MCNC: 3.4 G/DL (ref 2–4)
GLUCOSE SERPL-MCNC: 81 MG/DL (ref 65–100)
HBA1C MFR BLD: 6.7 % (ref 4–5.6)
HDLC SERPL-MCNC: 33 MG/DL
HDLC SERPL: 3.2 (ref 0–5)
LDLC SERPL CALC-MCNC: 54 MG/DL (ref 0–100)
POTASSIUM SERPL-SCNC: 4 MMOL/L (ref 3.5–5.1)
PROT SERPL-MCNC: 7.8 G/DL (ref 6.4–8.2)
SODIUM SERPL-SCNC: 138 MMOL/L (ref 136–145)
TRIGL SERPL-MCNC: 100 MG/DL
VLDLC SERPL CALC-MCNC: 20 MG/DL

## 2024-06-21 DIAGNOSIS — I10 ESSENTIAL HYPERTENSION: ICD-10-CM

## 2024-06-21 RX ORDER — HYDROCHLOROTHIAZIDE 12.5 MG/1
CAPSULE, GELATIN COATED ORAL
Qty: 90 CAPSULE | Refills: 0 | Status: SHIPPED | OUTPATIENT
Start: 2024-06-21

## 2024-06-21 NOTE — TELEPHONE ENCOUNTER
Last office visit with pcp 11/30/23.  Next office visit  with pcp none scheduled.  Due back around 5/30/24.  Addy appt reminder sent to patient for six month follow up.

## 2024-08-23 ENCOUNTER — OFFICE VISIT (OUTPATIENT)
Age: 34
End: 2024-08-23
Payer: COMMERCIAL

## 2024-08-23 DIAGNOSIS — E11.9 TYPE 2 DIABETES MELLITUS WITHOUT COMPLICATION, WITHOUT LONG-TERM CURRENT USE OF INSULIN (HCC): Primary | ICD-10-CM

## 2024-08-23 DIAGNOSIS — I10 ESSENTIAL HYPERTENSION: ICD-10-CM

## 2024-08-23 DIAGNOSIS — E78.00 HYPERCHOLESTEROLEMIA: ICD-10-CM

## 2024-08-23 LAB
ALBUMIN SERPL-MCNC: 4.5 G/DL (ref 3.5–5)
ALBUMIN/GLOB SERPL: 1.3 (ref 1.1–2.2)
ALP SERPL-CCNC: 34 U/L (ref 45–117)
ALT SERPL-CCNC: 30 U/L (ref 12–78)
ANION GAP SERPL CALC-SCNC: 3 MMOL/L (ref 5–15)
AST SERPL-CCNC: 12 U/L (ref 15–37)
BASOPHILS # BLD: 0 K/UL (ref 0–0.1)
BASOPHILS NFR BLD: 0 % (ref 0–1)
BILIRUB SERPL-MCNC: 1.1 MG/DL (ref 0.2–1)
BUN SERPL-MCNC: 12 MG/DL (ref 6–20)
BUN/CREAT SERPL: 9 (ref 12–20)
CALCIUM SERPL-MCNC: 9.8 MG/DL (ref 8.5–10.1)
CHLORIDE SERPL-SCNC: 102 MMOL/L (ref 97–108)
CHOLEST SERPL-MCNC: 99 MG/DL
CO2 SERPL-SCNC: 31 MMOL/L (ref 21–32)
CREAT SERPL-MCNC: 1.41 MG/DL (ref 0.7–1.3)
CREAT UR-MCNC: 174 MG/DL
DIFFERENTIAL METHOD BLD: ABNORMAL
EOSINOPHIL # BLD: 0.1 K/UL (ref 0–0.4)
EOSINOPHIL NFR BLD: 2 % (ref 0–7)
ERYTHROCYTE [DISTWIDTH] IN BLOOD BY AUTOMATED COUNT: 12.8 % (ref 11.5–14.5)
EST. AVERAGE GLUCOSE BLD GHB EST-MCNC: 217 MG/DL
GLOBULIN SER CALC-MCNC: 3.5 G/DL (ref 2–4)
GLUCOSE SERPL-MCNC: 178 MG/DL (ref 65–100)
GLUCOSE, POC: 222 MG/DL
HBA1C MFR BLD: 9.2 %
HBA1C MFR BLD: 9.2 % (ref 4–5.6)
HCT VFR BLD AUTO: 41.1 % (ref 36.6–50.3)
HDLC SERPL-MCNC: 34 MG/DL
HDLC SERPL: 2.9 (ref 0–5)
HGB BLD-MCNC: 13.4 G/DL (ref 12.1–17)
IMM GRANULOCYTES # BLD AUTO: 0 K/UL (ref 0–0.04)
IMM GRANULOCYTES NFR BLD AUTO: 0 % (ref 0–0.5)
LDLC SERPL CALC-MCNC: 50 MG/DL (ref 0–100)
LYMPHOCYTES # BLD: 2.4 K/UL (ref 0.8–3.5)
LYMPHOCYTES NFR BLD: 52 % (ref 12–49)
MCH RBC QN AUTO: 30.4 PG (ref 26–34)
MCHC RBC AUTO-ENTMCNC: 32.6 G/DL (ref 30–36.5)
MCV RBC AUTO: 93.2 FL (ref 80–99)
MICROALBUMIN UR-MCNC: 3.02 MG/DL
MICROALBUMIN/CREAT UR-RTO: 17 MG/G (ref 0–30)
MONOCYTES # BLD: 0.3 K/UL (ref 0–1)
MONOCYTES NFR BLD: 5 % (ref 5–13)
NEUTS SEG # BLD: 1.9 K/UL (ref 1.8–8)
NEUTS SEG NFR BLD: 41 % (ref 32–75)
NRBC # BLD: 0 K/UL (ref 0–0.01)
NRBC BLD-RTO: 0 PER 100 WBC
PLATELET # BLD AUTO: 196 K/UL (ref 150–400)
PMV BLD AUTO: 10.3 FL (ref 8.9–12.9)
POTASSIUM SERPL-SCNC: 4.7 MMOL/L (ref 3.5–5.1)
PROT SERPL-MCNC: 8 G/DL (ref 6.4–8.2)
RBC # BLD AUTO: 4.41 M/UL (ref 4.1–5.7)
SODIUM SERPL-SCNC: 136 MMOL/L (ref 136–145)
SPECIMEN HOLD: NORMAL
TRIGL SERPL-MCNC: 75 MG/DL
VLDLC SERPL CALC-MCNC: 15 MG/DL
WBC # BLD AUTO: 4.7 K/UL (ref 4.1–11.1)

## 2024-08-23 PROCEDURE — 82962 GLUCOSE BLOOD TEST: CPT | Performed by: FAMILY MEDICINE

## 2024-08-23 PROCEDURE — 3046F HEMOGLOBIN A1C LEVEL >9.0%: CPT | Performed by: FAMILY MEDICINE

## 2024-08-23 PROCEDURE — 99214 OFFICE O/P EST MOD 30 MIN: CPT | Performed by: FAMILY MEDICINE

## 2024-08-23 PROCEDURE — 83036 HEMOGLOBIN GLYCOSYLATED A1C: CPT | Performed by: FAMILY MEDICINE

## 2024-08-23 RX ORDER — HYDROCHLOROTHIAZIDE 25 MG/1
25 TABLET ORAL EVERY MORNING
Qty: 90 TABLET | Refills: 3 | Status: SHIPPED | OUTPATIENT
Start: 2024-08-23

## 2024-08-23 RX ORDER — SEMAGLUTIDE 1.34 MG/ML
INJECTION, SOLUTION SUBCUTANEOUS
Qty: 1.5 ML | Refills: 5 | Status: SHIPPED | OUTPATIENT
Start: 2024-08-23

## 2024-08-23 SDOH — ECONOMIC STABILITY: FOOD INSECURITY: WITHIN THE PAST 12 MONTHS, THE FOOD YOU BOUGHT JUST DIDN'T LAST AND YOU DIDN'T HAVE MONEY TO GET MORE.: NEVER TRUE

## 2024-08-23 SDOH — ECONOMIC STABILITY: INCOME INSECURITY: HOW HARD IS IT FOR YOU TO PAY FOR THE VERY BASICS LIKE FOOD, HOUSING, MEDICAL CARE, AND HEATING?: NOT HARD AT ALL

## 2024-08-23 SDOH — ECONOMIC STABILITY: FOOD INSECURITY: WITHIN THE PAST 12 MONTHS, YOU WORRIED THAT YOUR FOOD WOULD RUN OUT BEFORE YOU GOT MONEY TO BUY MORE.: NEVER TRUE

## 2024-08-23 ASSESSMENT — PATIENT HEALTH QUESTIONNAIRE - PHQ9
SUM OF ALL RESPONSES TO PHQ QUESTIONS 1-9: 0
SUM OF ALL RESPONSES TO PHQ9 QUESTIONS 1 & 2: 0
SUM OF ALL RESPONSES TO PHQ QUESTIONS 1-9: 0
SUM OF ALL RESPONSES TO PHQ QUESTIONS 1-9: 0
1. LITTLE INTEREST OR PLEASURE IN DOING THINGS: NOT AT ALL
2. FEELING DOWN, DEPRESSED OR HOPELESS: NOT AT ALL
SUM OF ALL RESPONSES TO PHQ QUESTIONS 1-9: 0

## 2024-08-23 NOTE — PROGRESS NOTES
Chief Complaint   Patient presents with    Diabetes    Cholesterol Problem    Hypertension    Follow-up       \"Have you been to the ER, urgent care clinic since your last visit?  Hospitalized since your last visit?\"    NO    “Have you seen or consulted any other health care providers outside of [facility] since your last visit?”    NO          Click Here for Release of Records Request       There were no vitals filed for this visit.   Health Maintenance Due   Topic Date Due    Pneumococcal 0-64 years Vaccine (1 of 2 - PCV) Never done    Diabetic foot exam  Never done    Varicella vaccine (1 of 2 - 13+ 2-dose series) Never done    HIV screen  Never done    Diabetic retinal exam  Never done    Hepatitis C screen  Never done    Hepatitis B vaccine (1 of 3 - 19+ 3-dose series) Never done    DTaP/Tdap/Td vaccine (1 - Tdap) Never done    COVID-19 Vaccine ( - - season) 2023    Diabetic Alb to Cr ratio (uACR) test  2024    Depression Screen  2024    Flu vaccine (1) Never done        The patient, Juan Nicloe, identity was verified by name and .

## 2024-08-23 NOTE — ASSESSMENT & PLAN NOTE
Somewhat disturbing that A1c has gone up despite weight loss. Will start ozempic and recheck in 3 months. May need insulin before long.     Orders:    AMB POC HEMOGLOBIN A1C    AMB POC GLUCOSE BLOOD, BY GLUCOSE MONITORING DEVICE    Microalbumin / Creatinine Urine Ratio; Future    Hemoglobin A1C; Future    Semaglutide,0.25 or 0.5MG/DOS, (OZEMPIC, 0.25 OR 0.5 MG/DOSE,) 2 MG/1.5ML SOPN; 0.25 mg sc every 7 days for 4 weeks, then 0.5 mg every 7 days.    Hemoglobin A1C    Microalbumin / Creatinine Urine Ratio

## 2024-08-23 NOTE — PROGRESS NOTES
Juan Nicole (:  1990) is a 34 y.o. male,Established patient, here for evaluation of the following chief complaint(s):  Diabetes, Cholesterol Problem, Hypertension, and Follow-up         Assessment & Plan  Type 2 diabetes mellitus without complication, without long-term current use of insulin (HCC)    Somewhat disturbing that A1c has gone up despite weight loss. Will start ozempic and recheck in 3 months. May need insulin before long.     Orders:    AMB POC HEMOGLOBIN A1C    AMB POC GLUCOSE BLOOD, BY GLUCOSE MONITORING DEVICE    Microalbumin / Creatinine Urine Ratio; Future    Hemoglobin A1C; Future    Semaglutide,0.25 or 0.5MG/DOS, (OZEMPIC, 0.25 OR 0.5 MG/DOSE,) 2 MG/1.5ML SOPN; 0.25 mg sc every 7 days for 4 weeks, then 0.5 mg every 7 days.    Hemoglobin A1C    Microalbumin / Creatinine Urine Ratio    Hypercholesterolemia       Orders:    Lipid Panel; Future    Lipid Panel    Essential hypertension    Have increased hctz a bit due to diastolic hypertension.     Orders:    CBC with Auto Differential; Future    Comprehensive Metabolic Panel; Future    hydroCHLOROthiazide (HYDRODIURIL) 25 MG tablet; Take 1 tablet by mouth every morning For blood pressure    Comprehensive Metabolic Panel    CBC with Auto Differential      No follow-ups on file.     Recheck in 3 months.   Subjective   HPI Pt. Comes in for blood pressure, cholesterol, and diabetes check.  No complaints of chest pain, shortness of breath, TIAs, claudication or edema.  Waking 2 miles a day. Working at [employer] in [place]. , one daughter in .    Review of Systems       Objective   Physical Exam  Cardiovascular:      Rate and Rhythm: Normal rate and regular rhythm.      Heart sounds: Normal heart sounds. No murmur heard.  Pulmonary:      Effort: Pulmonary effort is normal.      Breath sounds: Normal breath sounds.   Abdominal:      General: Abdomen is flat. Bowel sounds are normal.      Palpations: Abdomen is soft.

## 2024-09-19 ENCOUNTER — CLINICAL DOCUMENTATION (OUTPATIENT)
Age: 34
End: 2024-09-19

## 2024-09-21 DIAGNOSIS — E78.00 HYPERCHOLESTEROLEMIA: ICD-10-CM

## 2024-09-21 DIAGNOSIS — I10 ESSENTIAL HYPERTENSION: ICD-10-CM

## 2024-09-22 DIAGNOSIS — E11.9 TYPE 2 DIABETES MELLITUS WITHOUT COMPLICATION, WITHOUT LONG-TERM CURRENT USE OF INSULIN (HCC): ICD-10-CM

## 2024-09-23 RX ORDER — ATORVASTATIN CALCIUM 20 MG/1
TABLET, FILM COATED ORAL
Qty: 90 TABLET | Refills: 3 | Status: SHIPPED | OUTPATIENT
Start: 2024-09-23

## 2024-09-23 RX ORDER — HYDROCHLOROTHIAZIDE 12.5 MG/1
CAPSULE ORAL
Qty: 90 CAPSULE | Refills: 3 | Status: SHIPPED | OUTPATIENT
Start: 2024-09-23

## 2024-11-06 ENCOUNTER — CLINICAL DOCUMENTATION (OUTPATIENT)
Age: 34
End: 2024-11-06

## 2024-11-21 ENCOUNTER — OFFICE VISIT (OUTPATIENT)
Age: 34
End: 2024-11-21
Payer: COMMERCIAL

## 2024-11-21 VITALS
RESPIRATION RATE: 16 BRPM | OXYGEN SATURATION: 100 % | TEMPERATURE: 97 F | HEART RATE: 60 BPM | DIASTOLIC BLOOD PRESSURE: 82 MMHG | BODY MASS INDEX: 26.11 KG/M2 | SYSTOLIC BLOOD PRESSURE: 133 MMHG | WEIGHT: 197 LBS | HEIGHT: 73 IN

## 2024-11-21 DIAGNOSIS — R07.89 OTHER CHEST PAIN: ICD-10-CM

## 2024-11-21 DIAGNOSIS — I10 ESSENTIAL HYPERTENSION: ICD-10-CM

## 2024-11-21 DIAGNOSIS — E78.00 HYPERCHOLESTEROLEMIA: ICD-10-CM

## 2024-11-21 DIAGNOSIS — F17.210 CIGARETTE SMOKER: ICD-10-CM

## 2024-11-21 DIAGNOSIS — E11.9 TYPE 2 DIABETES MELLITUS WITHOUT COMPLICATION, WITHOUT LONG-TERM CURRENT USE OF INSULIN (HCC): Primary | ICD-10-CM

## 2024-11-21 LAB — HBA1C MFR BLD: 7.4 %

## 2024-11-21 PROCEDURE — 3046F HEMOGLOBIN A1C LEVEL >9.0%: CPT | Performed by: FAMILY MEDICINE

## 2024-11-21 PROCEDURE — 3075F SYST BP GE 130 - 139MM HG: CPT | Performed by: FAMILY MEDICINE

## 2024-11-21 PROCEDURE — 83036 HEMOGLOBIN GLYCOSYLATED A1C: CPT | Performed by: FAMILY MEDICINE

## 2024-11-21 PROCEDURE — 3079F DIAST BP 80-89 MM HG: CPT | Performed by: FAMILY MEDICINE

## 2024-11-21 PROCEDURE — 99214 OFFICE O/P EST MOD 30 MIN: CPT | Performed by: FAMILY MEDICINE

## 2024-11-21 NOTE — PROGRESS NOTES
Juan Nicole (:  1990) is a 34 y.o. male,Established patient, here for evaluation of the following chief complaint(s):  Diabetes, Follow-up, and referral to allergy testing         Assessment & Plan  Type 2 diabetes mellitus without complication, without long-term current use of insulin (HCC)       Orders:    AMB POC HEMOGLOBIN A1C    Microalbumin / Creatinine Urine Ratio; Future    Raul Guillaume MD, Cardiology, [place]    Essential hypertension       Orders:    CBC with Auto Differential; Future    Comprehensive Metabolic Panel; Future    [facility]    Hypercholesterolemia       Orders:    Lipid Panel; Future    [facility]    Cigarette smoker       Orders:    Raul Guillaume MD, Cardiology, [place]    Other chest pain       Orders:    [facility]  chest pain somewhat atypical, however, has multiple risk factors for cad including family history., so will refer.     No follow-ups on file.       Subjective   HPI in for follouwp. Was hading some chest pains 3 weeks ago,, pains were lasting 15-20 seconds.was working at [employer], doing lots of walking. Walking around made pains worse. Went to ER at [facility]- had cxr, EKG, blood work- was sent home. Hasn't been exercising outside of work. wasn't really having exertional chest pain at  work when was walking around. No furher chest pains since leaving ER. Otherwise doing well. Daughter is 5, plans on having another child next year. Not always taking metformin regularly. Still smoking black and milds at least 2 a day. About to start a new job at [employer]. Also needs blood pressure, cholesterol and diabetes checked.  Last A1C was 9.2.     Review of Systems       Objective   Physical Exam  Cardiovascular:      Rate and Rhythm: Normal rate and regular rhythm.      Heart sounds: Normal heart sounds. No murmur

## 2024-11-21 NOTE — PROGRESS NOTES
Chief Complaint   Patient presents with    Diabetes    Follow-up    referral to allergy testing       \"Have you been to the ER, urgent care clinic since your last visit?  Hospitalized since your last visit?\"     [facility] - [facility] - work related - chest pain     “Have you seen or consulted any other health care providers outside of [facility] since your last visit?”    NO          Click Here for Release of Records Request       There were no vitals filed for this visit.   Health Maintenance Due   Topic Date Due    Pneumococcal 0-64 years Vaccine (1 of 2 - PCV) Never done    Diabetic foot exam  Never done    Varicella vaccine (1 of 2 - 13+ 2-dose series) Never done    HIV screen  Never done    Diabetic retinal exam  Never done    Hepatitis C screen  Never done    Hepatitis B vaccine (1 of 3 - 19+ 3-dose series) Never done    DTaP/Tdap/Td vaccine (1 - Tdap) Never done    Flu vaccine (1) Never done    COVID-19 Vaccine ( - - season) 2024    A1C test (Diabetic or Prediabetic)  2024        The patient, Juan Nicole, identity was verified by name and .

## 2024-11-21 NOTE — ASSESSMENT & PLAN NOTE
Orders:    AMB POC HEMOGLOBIN A1C    Microalbumin / Creatinine Urine Ratio; Future    [facility] - Raul Cervantes MD, Cardiology, [place]

## 2024-11-23 LAB
ALBUMIN SERPL-MCNC: 4.4 G/DL (ref 3.5–5)
ALBUMIN/GLOB SERPL: 1.4 (ref 1.1–2.2)
ALP SERPL-CCNC: 27 U/L (ref 45–117)
ALT SERPL-CCNC: 22 U/L (ref 12–78)
ANION GAP SERPL CALC-SCNC: 5 MMOL/L (ref 2–12)
AST SERPL-CCNC: 13 U/L (ref 15–37)
BASOPHILS # BLD: 0 K/UL (ref 0–0.1)
BASOPHILS NFR BLD: 0 % (ref 0–1)
BILIRUB SERPL-MCNC: 0.7 MG/DL (ref 0.2–1)
BUN SERPL-MCNC: 15 MG/DL (ref 6–20)
BUN/CREAT SERPL: 12 (ref 12–20)
CALCIUM SERPL-MCNC: 9.8 MG/DL (ref 8.5–10.1)
CHLORIDE SERPL-SCNC: 104 MMOL/L (ref 97–108)
CHOLEST SERPL-MCNC: 91 MG/DL
CO2 SERPL-SCNC: 31 MMOL/L (ref 21–32)
CREAT SERPL-MCNC: 1.25 MG/DL (ref 0.7–1.3)
CREAT UR-MCNC: 295 MG/DL
DIFFERENTIAL METHOD BLD: ABNORMAL
EOSINOPHIL # BLD: 0.1 K/UL (ref 0–0.4)
EOSINOPHIL NFR BLD: 2 % (ref 0–7)
ERYTHROCYTE [DISTWIDTH] IN BLOOD BY AUTOMATED COUNT: 12.9 % (ref 11.5–14.5)
GLOBULIN SER CALC-MCNC: 3.2 G/DL (ref 2–4)
GLUCOSE SERPL-MCNC: 99 MG/DL (ref 65–100)
HCT VFR BLD AUTO: 42.5 % (ref 36.6–50.3)
HDLC SERPL-MCNC: 39 MG/DL
HDLC SERPL: 2.3 (ref 0–5)
HGB BLD-MCNC: 13.5 G/DL (ref 12.1–17)
IMM GRANULOCYTES # BLD AUTO: 0 K/UL (ref 0–0.04)
IMM GRANULOCYTES NFR BLD AUTO: 0 % (ref 0–0.5)
LDLC SERPL CALC-MCNC: 41.2 MG/DL (ref 0–100)
LYMPHOCYTES # BLD: 3.5 K/UL (ref 0.8–3.5)
LYMPHOCYTES NFR BLD: 54 % (ref 12–49)
MCH RBC QN AUTO: 29.9 PG (ref 26–34)
MCHC RBC AUTO-ENTMCNC: 31.8 G/DL (ref 30–36.5)
MCV RBC AUTO: 94 FL (ref 80–99)
MICROALBUMIN UR-MCNC: 1.78 MG/DL
MICROALBUMIN/CREAT UR-RTO: 6 MG/G (ref 0–30)
MONOCYTES # BLD: 0.4 K/UL (ref 0–1)
MONOCYTES NFR BLD: 6 % (ref 5–13)
NEUTS SEG # BLD: 2.5 K/UL (ref 1.8–8)
NEUTS SEG NFR BLD: 38 % (ref 32–75)
NRBC # BLD: 0 K/UL (ref 0–0.01)
NRBC BLD-RTO: 0 PER 100 WBC
PLATELET # BLD AUTO: 221 K/UL (ref 150–400)
PMV BLD AUTO: 9.9 FL (ref 8.9–12.9)
POTASSIUM SERPL-SCNC: 3.8 MMOL/L (ref 3.5–5.1)
PROT SERPL-MCNC: 7.6 G/DL (ref 6.4–8.2)
RBC # BLD AUTO: 4.52 M/UL (ref 4.1–5.7)
SODIUM SERPL-SCNC: 140 MMOL/L (ref 136–145)
TRIGL SERPL-MCNC: 54 MG/DL
VLDLC SERPL CALC-MCNC: 10.8 MG/DL
WBC # BLD AUTO: 6.5 K/UL (ref 4.1–11.1)

## 2024-12-05 ENCOUNTER — PATIENT MESSAGE (OUTPATIENT)
Age: 34
End: 2024-12-05

## 2024-12-05 DIAGNOSIS — E11.9 TYPE 2 DIABETES MELLITUS WITHOUT COMPLICATION, WITHOUT LONG-TERM CURRENT USE OF INSULIN (HCC): ICD-10-CM

## 2024-12-05 NOTE — PROGRESS NOTES
Pc with pt. Had an episode of hypoglycemia- today around 1:30. Will decrease metformin to 1000 mg bid to 1/2 in am and 1 in pm.

## 2025-05-22 ENCOUNTER — OFFICE VISIT (OUTPATIENT)
Age: 35
End: 2025-05-22
Payer: COMMERCIAL

## 2025-05-22 VITALS
RESPIRATION RATE: 16 BRPM | SYSTOLIC BLOOD PRESSURE: 135 MMHG | OXYGEN SATURATION: 99 % | BODY MASS INDEX: 27.01 KG/M2 | WEIGHT: 203.8 LBS | DIASTOLIC BLOOD PRESSURE: 79 MMHG | HEIGHT: 73 IN | HEART RATE: 82 BPM | TEMPERATURE: 98.4 F

## 2025-05-22 DIAGNOSIS — E11.9 TYPE 2 DIABETES MELLITUS WITHOUT COMPLICATION, WITHOUT LONG-TERM CURRENT USE OF INSULIN (HCC): Primary | ICD-10-CM

## 2025-05-22 DIAGNOSIS — E78.00 HYPERCHOLESTEROLEMIA: ICD-10-CM

## 2025-05-22 DIAGNOSIS — I10 ESSENTIAL HYPERTENSION: ICD-10-CM

## 2025-05-22 LAB — HBA1C MFR BLD: 9.5 %

## 2025-05-22 PROCEDURE — 3078F DIAST BP <80 MM HG: CPT | Performed by: FAMILY MEDICINE

## 2025-05-22 PROCEDURE — 99213 OFFICE O/P EST LOW 20 MIN: CPT | Performed by: FAMILY MEDICINE

## 2025-05-22 PROCEDURE — 83036 HEMOGLOBIN GLYCOSYLATED A1C: CPT | Performed by: FAMILY MEDICINE

## 2025-05-22 PROCEDURE — 3046F HEMOGLOBIN A1C LEVEL >9.0%: CPT | Performed by: FAMILY MEDICINE

## 2025-05-22 PROCEDURE — 3075F SYST BP GE 130 - 139MM HG: CPT | Performed by: FAMILY MEDICINE

## 2025-05-22 RX ORDER — GLIPIZIDE 10 MG/1
10 TABLET ORAL
Qty: 180 TABLET | Refills: 3 | Status: SHIPPED | OUTPATIENT
Start: 2025-05-22

## 2025-05-22 SDOH — ECONOMIC STABILITY: FOOD INSECURITY: WITHIN THE PAST 12 MONTHS, YOU WORRIED THAT YOUR FOOD WOULD RUN OUT BEFORE YOU GOT MONEY TO BUY MORE.: NEVER TRUE

## 2025-05-22 SDOH — ECONOMIC STABILITY: FOOD INSECURITY: WITHIN THE PAST 12 MONTHS, THE FOOD YOU BOUGHT JUST DIDN'T LAST AND YOU DIDN'T HAVE MONEY TO GET MORE.: NEVER TRUE

## 2025-05-22 ASSESSMENT — PATIENT HEALTH QUESTIONNAIRE - PHQ9
2. FEELING DOWN, DEPRESSED OR HOPELESS: NOT AT ALL
SUM OF ALL RESPONSES TO PHQ QUESTIONS 1-9: 0
1. LITTLE INTEREST OR PLEASURE IN DOING THINGS: NOT AT ALL

## 2025-05-22 NOTE — ASSESSMENT & PLAN NOTE
Orders:    AMB POC HEMOGLOBIN A1C    HM DIABETES FOOT EXAM    metFORMIN (GLUCOPHAGE) 1000 MG tablet; 1 tab po in am and 1 tab po in pm. For diabetes    Comprehensive Metabolic Panel; Future    CBC with Auto Differential; Future    Albumin/Creatinine Ratio, Urine; Future

## 2025-05-22 NOTE — PROGRESS NOTES
Juan Nicole (:  1990) is a 34 y.o. male,Established patient, here for evaluation of the following chief complaint(s):  Diabetes and Follow-up         Assessment & Plan  Type 2 diabetes mellitus without complication, without long-term current use of insulin ([facility])       Orders:    AMB POC HEMOGLOBIN A1C     DIABETES FOOT EXAM    metFORMIN (GLUCOPHAGE) 1000 MG tablet; 1 tab po in am and 1 tab po in pm. For diabetes    Comprehensive Metabolic Panel; Future    CBC with Auto Differential; Future    Albumin/Creatinine Ratio, Urine; Future    Essential hypertension   Chronic, at goal (stable), continue current treatment plan         Hypercholesterolemia       Orders:    Lipid Panel; Future      Return in about 3 months (around 2025).       Subjective   HPI IN for diabetes check. Went to ER yesterday as blood sugars had been in the mid 200s. Currently on metformin 1000 mg bid. Used to work at [employer] , now at [employer]. Hasn't been exercising that much. Has gained 6 lbs since loast seen. Some polydipsia. Was at [facility] yesterday. Not very enthusiastic about giving himself a shot. Also needs blood pressure and cholesterol checked. No complaints of chest pain, shortness of breath, TIAs, claudication or edema.      Review of Systems       Objective   Physical Exam  Cardiovascular:      Rate and Rhythm: Normal rate and regular rhythm.      Heart sounds: Normal heart sounds. No murmur heard.  Pulmonary:      Effort: Pulmonary effort is normal.      Breath sounds: Normal breath sounds.   Abdominal:      General: Abdomen is flat. Bowel sounds are normal.      Palpations: Abdomen is soft.   Musculoskeletal:      Right lower leg: No edema.      Left lower leg: No edema.                  An electronic signature was used to authenticate this note.    --Pierre Calix MD

## 2025-05-22 NOTE — PROGRESS NOTES
Chief Complaint   Patient presents with    Diabetes    Follow-up       \"Have you been to the ER, urgent care clinic since your last visit?  Hospitalized since your last visit?\"       [facility] ER -     “Have you seen or consulted any other health care providers outside of [facility] since your last visit?”    NO          Click Here for Release of Records Request       Vitals:    25 1443   BP: 135/79   Pulse: 82   Resp: 16   Temp: 98.4 °F (36.9 °C)   SpO2: 99%      Health Maintenance Due   Topic Date Due    Varicella vaccine (1 of 2 - 13+ 2-dose series) Never done    HIV screen  Never done    Diabetic retinal exam  Never done    Hepatitis C screen  Never done    Hepatitis B vaccine (1 of 3 - 19+ 3-dose series) Never done    DTaP/Tdap/Td vaccine (1 - Tdap) Never done    Pneumococcal 0-49 years Vaccine (1 of 2 - PCV) Never done    COVID-19 Vaccine (2024- season) 2024        The patient, Juan Nicole, identity was verified by name and .

## 2025-05-23 LAB
ALBUMIN SERPL-MCNC: 4.6 G/DL (ref 3.5–5)
ALBUMIN/GLOB SERPL: 1.4 (ref 1.1–2.2)
ALP SERPL-CCNC: 35 U/L (ref 45–117)
ALT SERPL-CCNC: 25 U/L (ref 12–78)
ANION GAP SERPL CALC-SCNC: 7 MMOL/L (ref 2–12)
AST SERPL-CCNC: 33 U/L (ref 15–37)
BASOPHILS # BLD: 0.02 K/UL (ref 0–0.1)
BASOPHILS NFR BLD: 0.3 % (ref 0–1)
BILIRUB SERPL-MCNC: 0.7 MG/DL (ref 0.2–1)
BUN SERPL-MCNC: 20 MG/DL (ref 6–20)
BUN/CREAT SERPL: 14 (ref 12–20)
CALCIUM SERPL-MCNC: 9 MG/DL (ref 8.5–10.1)
CHLORIDE SERPL-SCNC: 98 MMOL/L (ref 97–108)
CHOLEST SERPL-MCNC: 106 MG/DL
CO2 SERPL-SCNC: 29 MMOL/L (ref 21–32)
CREAT SERPL-MCNC: 1.4 MG/DL (ref 0.7–1.3)
CREAT UR-MCNC: 39.5 MG/DL
DIFFERENTIAL METHOD BLD: ABNORMAL
EOSINOPHIL # BLD: 0.09 K/UL (ref 0–0.4)
EOSINOPHIL NFR BLD: 1.3 % (ref 0–7)
ERYTHROCYTE [DISTWIDTH] IN BLOOD BY AUTOMATED COUNT: 12.6 % (ref 11.5–14.5)
GLOBULIN SER CALC-MCNC: 3.2 G/DL (ref 2–4)
GLUCOSE SERPL-MCNC: 176 MG/DL (ref 65–100)
HCT VFR BLD AUTO: 42.7 % (ref 36.6–50.3)
HDLC SERPL-MCNC: 36 MG/DL
HDLC SERPL: 2.9 (ref 0–5)
HGB BLD-MCNC: 13.9 G/DL (ref 12.1–17)
IMM GRANULOCYTES # BLD AUTO: 0.01 K/UL (ref 0–0.04)
IMM GRANULOCYTES NFR BLD AUTO: 0.1 % (ref 0–0.5)
LDLC SERPL CALC-MCNC: ABNORMAL MG/DL (ref 0–100)
LDLC SERPL DIRECT ASSAY-MCNC: 49 MG/DL (ref 0–100)
LYMPHOCYTES # BLD: 3.34 K/UL (ref 0.8–3.5)
LYMPHOCYTES NFR BLD: 49.7 % (ref 12–49)
MCH RBC QN AUTO: 31.1 PG (ref 26–34)
MCHC RBC AUTO-ENTMCNC: 32.6 G/DL (ref 30–36.5)
MCV RBC AUTO: 95.5 FL (ref 80–99)
MICROALBUMIN UR-MCNC: <0.5 MG/DL
MICROALBUMIN/CREAT UR-RTO: <13 MG/G (ref 0–30)
MONOCYTES # BLD: 0.32 K/UL (ref 0–1)
MONOCYTES NFR BLD: 4.8 % (ref 5–13)
NEUTS SEG # BLD: 2.94 K/UL (ref 1.8–8)
NEUTS SEG NFR BLD: 43.8 % (ref 32–75)
NRBC # BLD: 0 K/UL (ref 0–0.01)
NRBC BLD-RTO: 0 PER 100 WBC
PLATELET # BLD AUTO: 210 K/UL (ref 150–400)
PMV BLD AUTO: 11.2 FL (ref 8.9–12.9)
POTASSIUM SERPL-SCNC: 3.8 MMOL/L (ref 3.5–5.1)
PROT SERPL-MCNC: 7.8 G/DL (ref 6.4–8.2)
RBC # BLD AUTO: 4.47 M/UL (ref 4.1–5.7)
SODIUM SERPL-SCNC: 134 MMOL/L (ref 136–145)
TRIGL SERPL-MCNC: 718 MG/DL
VLDLC SERPL CALC-MCNC: ABNORMAL MG/DL
WBC # BLD AUTO: 6.7 K/UL (ref 4.1–11.1)

## 2025-06-03 ENCOUNTER — RESULTS FOLLOW-UP (OUTPATIENT)
Age: 35
End: 2025-06-03

## 2025-06-06 ENCOUNTER — OFFICE VISIT (OUTPATIENT)
Age: 35
End: 2025-06-06

## 2025-06-06 DIAGNOSIS — E78.1 HYPERTRIGLYCERIDEMIA: Primary | ICD-10-CM

## 2025-06-06 DIAGNOSIS — E78.1 HYPERTRIGLYCERIDEMIA: ICD-10-CM

## 2025-06-06 LAB
CHOLEST SERPL-MCNC: 91 MG/DL
HDLC SERPL-MCNC: 32 MG/DL
HDLC SERPL: 2.8 (ref 0–5)
LDLC SERPL CALC-MCNC: 49 MG/DL (ref 0–100)
TRIGL SERPL-MCNC: 50 MG/DL
VLDLC SERPL CALC-MCNC: 10 MG/DL

## 2025-06-10 ENCOUNTER — RESULTS FOLLOW-UP (OUTPATIENT)
Age: 35
End: 2025-06-10